# Patient Record
Sex: FEMALE | Race: BLACK OR AFRICAN AMERICAN | NOT HISPANIC OR LATINO | URBAN - METROPOLITAN AREA
[De-identification: names, ages, dates, MRNs, and addresses within clinical notes are randomized per-mention and may not be internally consistent; named-entity substitution may affect disease eponyms.]

---
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
## 2023-12-27 NOTE — ED PROVIDER NOTE - CARDIAC, MLM
Does the Patient have a central line?  Yes: See Invasive (Oncology) Lines Flowsheet for CVAD documentation.       Fall risk 35  (Hernandez Fall Risk)    Distress Tool   N/A at this time     
Normal rate, regular rhythm.  Heart sounds S1, S2.  No murmurs, rubs or gallops.